# Patient Record
Sex: FEMALE
[De-identification: names, ages, dates, MRNs, and addresses within clinical notes are randomized per-mention and may not be internally consistent; named-entity substitution may affect disease eponyms.]

---

## 2018-01-09 ENCOUNTER — HOSPITAL ENCOUNTER (EMERGENCY)
Dept: HOSPITAL 25 - UCCORT | Age: 58
Discharge: HOME | End: 2018-01-09
Payer: COMMERCIAL

## 2018-01-09 VITALS — DIASTOLIC BLOOD PRESSURE: 64 MMHG | SYSTOLIC BLOOD PRESSURE: 130 MMHG

## 2018-01-09 DIAGNOSIS — K14.8: Primary | ICD-10-CM

## 2018-01-09 DIAGNOSIS — K14.6: ICD-10-CM

## 2018-01-09 PROCEDURE — 87205 SMEAR GRAM STAIN: CPT

## 2018-01-09 PROCEDURE — 99212 OFFICE O/P EST SF 10 MIN: CPT

## 2018-01-09 PROCEDURE — 96372 THER/PROPH/DIAG INJ SC/IM: CPT

## 2018-01-09 PROCEDURE — G0463 HOSPITAL OUTPT CLINIC VISIT: HCPCS

## 2018-01-09 PROCEDURE — 87640 STAPH A DNA AMP PROBE: CPT

## 2018-01-09 PROCEDURE — 87641 MR-STAPH DNA AMP PROBE: CPT

## 2018-01-09 PROCEDURE — 87070 CULTURE OTHR SPECIMN AEROBIC: CPT

## 2018-01-09 NOTE — UC
UC Dental HPI





- HPI Summary


HPI Summary: 





57 year old female presents with complains of sore/swollen tongue. 





- History of Current Complaint


Chief Complaint: UCDentalProblem


Stated Complaint: ORAL COMPLAINT


Time Seen by Provider: 01/09/18 14:32


Hx Obtained From: Patient


Hx Last Menstrual Period: n/a


Onset/Duration: Sudden Onset


Severity: Moderate


Pain Scale Used: 0-10 Numeric - 5





- Allergies/Home Medications


Allergies/Adverse Reactions: 


 Allergies











Allergy/AdvReac Type Severity Reaction Status Date / Time


 


DAIRY Allergy  Vomiting Uncoded 01/09/18 14:29











Home Medications: 


 Home Medications





Insulin Aspart [Novolog Flexpen] 1 unit SC TID 01/09/18 [History Confirmed 01/09 /18]











PMH/Surg Hx/FS Hx/Imm Hx


Previously Healthy: Yes





- Surgical History


Surgical History: Yes


Surgery Procedure, Year, and Place: Rt SHOULDER-DECOMPRESSION/DEBREEDING 2011.  

VECTRECTOMY (cleared for mri 12/1/2011); TUBAL LIGATION.  GASTRO BYPASS.  Lt 

TRIGGER FINGER.  HYSTERECTOMY





- Family History


Known Family History: Positive: None





- Social History


Alcohol Use: Rare


Substance Use Type: None


Smoking Status (MU): Never Smoked Tobacco


Have You Smoked in the Last Year: No





- Immunization History


Most Recent Influenza Vaccination: has not had





Review of Systems


Constitutional: Negative


Skin: Negative


Eyes: Negative


ENT: Other - swollen tongue


Respiratory: Negative


Cardiovascular: Negative


Gastrointestinal: Negative


Genitourinary: Negative


Motor: Negative


Neurovascular: Negative


Musculoskeletal: Negative


Neurological: Negative


Psychological: Negative


All Other Systems Reviewed And Are Negative: Yes





Physical Exam


Triage Information Reviewed: Yes


Vital Signs: 


 Initial Vital Signs











Temp  36.6 C   01/09/18 14:26


 


Pulse  76   01/09/18 14:26


 


Resp  16   01/09/18 14:26


 


BP  130/64   01/09/18 14:26


 


Pulse Ox  98   01/09/18 14:26











Eye Exam: Normal


ENT Exam: Normal


ENT: Positive: Other - swollen/erythematous tongue


Dental Exam: Normal


Neck exam: Normal


Neck: Positive: 1


Respiratory Exam: Normal


Cardiovascular Exam: Normal


Abdominal Exam: Normal


Musculoskeletal Exam: Normal


Neurological Exam: Normal


Psychological Exam: Normal


Skin Exam: Normal





Dental Complaint Course/Dx





- Differential Dx/Diagnosis


Provider Diagnoses: swollen tongue.  painful tongue





Discharge





- Discharge Plan


Condition: Stable


Disposition: HOME


Prescriptions: 


Amoxicillin/Clavulanate 600 [Augmentin Es-600 (NF)] 600 mg PO BID #100 btl


Fluconazole [Diflucan 150 MG (NF)] 150 mg PO ONCE #1 tab


Magic M W2 Andrey/Maal/Nyst/Lido* 5 ml SWISH SPIT QID PRN #120 ml


 PRN Reason: Pain


predniSONE TAB* [Deltasone TAB*] 40 mg PO DAILY #8 tab


Patient Education Materials:  Oral Candidiasis (ED)


Forms:  *Work Release


Referrals: 


Andrew Moncada MD [Primary Care Provider] -

## 2019-04-17 NOTE — HP
Amended report to enter cosigning physician.



PREOPERATIVE HISTORY AND PHYSICAL:

 

DATE OF ADMISSION/SURGERY:  04/23/19

 

DATE OF OFFICE VISIT/ENCOUNTER:  04/11/19

 

ATTENDING SURGEON:  Mayelin Sheppard MD* (dictated by CORINNE Andino).

 

PROCEDURE:  Right index and ring trigger finger releases.

 

HISTORY OF PRESENT ILLNESS:  This is a 58-year-old female who has had problems 
with triggering in her right index and ring fingers since 2017.  This is a work-
related problem.  She has received cortisone injections to both fingers, more 
than once over the past couple of years and notes it had been helpful.  
Unfortunately, the symptoms returned.  She is now interested in pursing more 
definitive treatment for this problem in the form of trigger finger releases.

 

PAST MEDICAL HISTORY:

1.  ADD.

2.  Diabetes type 1.

3.  Hypercholesteremia.

 

PAST SURGICAL HISTORY:

1.  TFCC repair, left wrist.

2.  Bilateral trigger thumb releases.

3.  Bilateral cataract removal.

4.  Right middle and left index trigger finger releases.

5.  Right shoulder surgery.

6.  Laser surgery for detached retina.

7.  Hysterectomy.

8.  Vitrectomy.

 

CURRENT MEDICATIONS:

1.  Adderall 10 mg b.i.d.

2.  Basaglar KwikPen 100 units/mL 20 units daily.

3.  NovoLog FlexPen 100 units/mL inject 1 unit at mealtime.

4.  Singulair 10 mg daily.

5.  Timolol malleate ophthalmic gel forming 0.5 one drop each eye at bedtime.

6.  Simvastatin 20 mg daily.

 

ALLERGIES:  No known drug allergies, she does have seasonal allergies.

 

FAMILY MEDICAL HISTORY:  Congestive heart failure, uterine cancer, Garcia 
syndrome, and ovarian cancer.

 

SOCIAL HISTORY:  She is employed at [employer].  She denies tobacco 
and recreational drug use.  She drinks alcohol on occasion.

 

REVIEW OF SYSTEMS:  Negative for general, cephalic, cardiovascular, respiratory
, GI, , other musculoskeletal, integumentary, endocrine, neurologic, and 
hematologic symptoms.  Infectious Disease:  Negative for MRSA, hepatitis C, HIV.

 

                               PHYSICAL EXAMINATION

 

GENERAL:  Well-developed, well-nourished, 58-year-old female, in no acute 
distress.

 

VITAL SIGNS:  Height 5 feet 2 inches, weight 185 pounds, pulse rate 82, blood 
pressure 120/80.

 

HEENT:  Normocephalic, atraumatic.  She has slight deviation of her right eye 
but pupils were equal and reactive to light and accommodation.  Extraocular 
movements are intact.  Throat is clear.

 

NECK:  Supple.  No palpable lymph nodes.

 

PULMONARY:  Lungs are clear to auscultation bilaterally.  No wheezes, rales, or 
rhonchi.

 

CARDIOVASCULAR:  Regular rate and rhythm.  S1, S2.  No murmurs, rubs, or 
gallops. No edema.

 

ABDOMEN:  Positive bowel sounds, soft, nontender.

 

NEUROLOGICAL:  Alert and oriented x3.  Cranial nerves II through XII are 
intact. Sensation is intact to light touch.

 

MUSCULOSKELETAL:  On exam of her right hand, there is no visible swelling.  
Skin is intact.  She has tenderness to palpation at the A1 pulleys at both the 
right index and ring fingers.  She has difficulty fully flexing the fingers and 
active triggering of the index finger.

 

 IMPRESSION:  Right index and ring trigger fingers.

 

PLAN:  The patient is scheduled to undergo right index and ring trigger finger 
releases with Dr. Sheppard on 04/23/19.  She will return to the office 10 days 
postop for followup and suture removal.  A prescription for Ultracet was e-
scribed to the patient's pharmacy for postoperative pain management.

 

 ____________________________________ 

CORINNE ANDINO

 

[id number]/[id number] #: [id number]

BAHMAN

## 2019-04-23 ENCOUNTER — HOSPITAL ENCOUNTER (OUTPATIENT)
Dept: HOSPITAL 25 - OREAST | Age: 59
Discharge: HOME | End: 2019-04-23
Attending: ORTHOPAEDIC SURGERY
Payer: COMMERCIAL

## 2019-04-23 VITALS — SYSTOLIC BLOOD PRESSURE: 121 MMHG | DIASTOLIC BLOOD PRESSURE: 69 MMHG

## 2019-04-23 DIAGNOSIS — F98.8: ICD-10-CM

## 2019-04-23 DIAGNOSIS — M65.321: Primary | ICD-10-CM

## 2019-04-23 DIAGNOSIS — M65.341: ICD-10-CM

## 2019-04-23 DIAGNOSIS — E78.00: ICD-10-CM

## 2019-04-23 DIAGNOSIS — E10.9: ICD-10-CM

## 2019-04-23 NOTE — OP
DATE OF OPERATION:  04/23/19 [facility]

 

DATE OF BIRTH:  [birth year]

 

SURGEON:  Mayelin Sheppard MD.

 

ASSISTANT:  CORINNE Andino.

 

ANESTHESIA:  Local MAC.

 

PRE-OP DIAGNOSIS:  Trigger finger of the right index and ring fingers.

 

POST-OP DIAGNOSIS:  Trigger finger of the right index and ring fingers.

 

OPERATIVE PROCEDURE:  Trigger release, right index and ring fingers.

 

ESTIMATED BLOOD LOSS:  Zero.

 

TOURNIQUET TIME:  Approximately 10 minutes.

 

INDICATIONS FOR PROCEDURE:  Nery is a 58-year-old female who has triggering 
and locking of her right index and ring fingers.  She presents for trigger 
release.

 

DESCRIPTION OF PROCEDURE:  The patient was brought to the operating room, was 
given a sedation anesthetic and a local infiltration of 10 cc of 1% plain 
lidocaine in the palm of her right hand.  The skin of her right hand and 
forearm was prepped and draped in the usual sterile fashion.  The hand and 
forearm were exsanguinated and the tourniquet elevated to 250 mmHg.  A 
transverse incision was made centered over the A1 pulley of the index finger 
and the ring finger.  We dissected through the subcutaneous tissue down to both 
pulleys.  The pulleys were incised longitudinally with a knife and the flexor 
tendons completely released.  The flexor tendons were in good condition.  The 
wounds were irrigated and the skin edges reapproximated with 4-0 nylon suture.  
The wounds were dressed with Xeroform, 4x4, Webril, and an Ace wrap.  The 
patient tolerated the procedure well and was brought to the recovery room in 
good condition.

 

 [id number]/CPS #: [id number]

MTDDANNIELLE

## 2019-07-26 ENCOUNTER — HOSPITAL ENCOUNTER (EMERGENCY)
Dept: HOSPITAL 25 - UCCORT | Age: 59
Discharge: HOME | End: 2019-07-26
Payer: COMMERCIAL

## 2019-07-26 VITALS — DIASTOLIC BLOOD PRESSURE: 74 MMHG | SYSTOLIC BLOOD PRESSURE: 137 MMHG

## 2019-07-26 DIAGNOSIS — E10.8: Primary | ICD-10-CM

## 2019-07-26 DIAGNOSIS — Z79.4: ICD-10-CM

## 2019-07-26 DIAGNOSIS — E06.3: ICD-10-CM

## 2019-07-26 DIAGNOSIS — M77.12: ICD-10-CM

## 2019-07-26 PROCEDURE — G0463 HOSPITAL OUTPT CLINIC VISIT: HCPCS

## 2019-07-26 PROCEDURE — 99211 OFF/OP EST MAY X REQ PHY/QHP: CPT

## 2019-07-26 NOTE — ED
Upper Extremity Pain





- HPI Summary


HPI Summary: 





58 yr old female with the complain of lateral left elbow pain.  Onset June 6th.

  The patient states that she had right hand surgery earlier in the spring.  

She has been over using the left arm, and carried a lot of bags shopping with 

just the left hand.  The following morning, June 6th, she experienced pain over 

the left lateral epicondyle.  The pain is worse with  movements and 

trying to  things like motion of picking up a cup of coffee.  She had no 

fall or direct trauma.  





- History of Current Complaint


Chief Complaint: UCUpperExtremity


Stated Complaint: LT ELBOW/ARM PAIN


Time Seen by Provider: 07/26/19 16:31


Hx Last Menstrual Period: n/a





- Allergies/Home Medications


Allergies/Adverse Reactions: 


 Allergies











Allergy/AdvReac Type Severity Reaction Status Date / Time


 


DAIRY Allergy  Vomiting Uncoded 04/23/19 06:47


 


seasonal Allergy  Unknown Uncoded 04/23/19 06:47





   Reaction  





   Details  











Home Medications: 


 Home Medications





Insulin Glargine,Hum.rec.anlog [Basaglar Kwikpen] 18 unit SC QAM 07/26/19 [

History Confirmed 07/26/19]











PMH/Surg Hx/FS Hx/Imm Hx


Endocrine/Hematology History: Reports: Hx Diabetes - TYPE I- ON INSULIN FOR, Hx 

Thyroid Disease - HASHIMOTO'S DISEASE


Cardiovascular History: 


   Denies: Hx Hypertension, Hx Pacemaker/ICD, Other Cardiovascular Problems/

Disorders


Respiratory History: 


   Denies: Hx Asthma, Hx Chronic Obstructive Pulmonary Disease (COPD)


GI History: Reports: Other GI Disorders - HOSHIMATOS DISEASE


   Denies: Hx Ulcer


 History: 


   Denies: Other  Problems/Disorders


Musculoskeletal History: Reports: Hx Arthritis


Sensory History: Reports: Hx Cataracts - BILATERAL EYES, Hx Contacts or Glasses 

- GLASSES, Hx Glaucoma - BOTH EYES


   Denies: Hx Hearing Aid


Opthamlomology History: Reports: Hx Cataracts - BILATERAL EYES, Hx Contacts or 

Glasses - GLASSES, Hx Glaucoma - BOTH EYES


Neurological History: Reports: Hx Nerve Disease - NEUROPATHY IN FEET, Other 

Neuro Impairments/Disorders - NEUROPATHY IN FEET


Psychiatric History: 


   Denies: Hx Panic Disorder





- Cancer History


Hx Chemotherapy: No


Hx Radiation Therapy: No





- Surgical History


Surgery Procedure, Year, and Place: Rt SHOULDER-DECOMPRESSION/DEBREEDING 2011.  

VITRECTOMY (cleared for mri 12/1/2011); TUBAL LIGATION.  GASTRO BYPASS.  Lt 

TRIGGER FINGER.  HYSTERECTOMY


Hx Anesthesia Reactions: Yes - NAUSEA/VOMITING


Infectious Disease History: No


Infectious Disease History: 


   Denies: Hx Clostridium Difficile, Hx Hepatitis, Hx Human Immunodeficiency 

Virus (HIV), Hx of Known/Suspected MRSA, Hx Shingles, Hx Tuberculosis, Hx Known/

Suspected VRE, Hx Known/Suspected VRSA, History Other Infectious Disease, 

Traveled Outside the [place] in Last 30 Days





- Family History


Known Family History: Positive: None





- Social History


Alcohol Use: Rare


Alcohol Amount: SOCIALLY


Substance Use Type: Reports: None


Smoking Status (MU): Never Smoked Tobacco


Have You Smoked in the Last Year: No





Review of Systems


Constitutional: Negative


Eyes: Negative


Positive: Other - left lateral elbow pain


All Other Systems Reviewed And Are Negative: Yes





Physical Exam


Triage Information Reviewed: Yes


Vital Signs On Initial Exam: 


 Initial Vitals











Temp Pulse Resp BP Pulse Ox


 


 98.2 F   78   16   137/74   98 


 


 07/26/19 16:10  07/26/19 16:10  07/26/19 16:10  07/26/19 16:10  07/26/19 16:10











Vital Signs Reviewed: Yes


Appearance: Positive: Well-Appearing, No Pain Distress


Skin: Positive: Warm, Skin Color Reflects Adequate Perfusion


Head/Face: Positive: Normal Head/Face Inspection


Eyes: Positive: EOMI


ENT: Positive: Normal ENT inspection


Neck: Positive: Nontender


Respiratory/Lung Sounds: Positive: Clear to Auscultation, Breath Sounds Present


Cardiovascular: Positive: Pulses are Symmetrical in both Upper and Lower 

Extremities


Abdomen Description: Negative: Distended


Musculoskeletal: Positive: Other - left elbow without effusion.  There is 

tenderness only over the lateral epicondyle left elbow with worsening of 

discomfort with dorsiflexion of the left wrist and engaging the posterior 

compartment muscles of the left forearm.  Making a band with my hand squeezing 

the muscles a little distal to the lat epicondyle, she has no pain on 

dorsiflexion of the left wrist.


Neurological: Positive: Sensory/Motor Intact, Alert, Oriented to Person Place, 

Time, CN Intact II-III


Psychiatric: Positive: Normal





Diagnostics





- Vital Signs


 Vital Signs











  Temp Pulse Resp BP Pulse Ox


 


 07/26/19 16:10  98.2 F  78  16  137/74  98














- Laboratory


Lab Statement: Any lab studies that have been ordered have been reviewed, and 

results considered in the medical decision making process.





- Radiology


  ** left elbow. 


Radiology Interpretation Completed By: Radiologist - NEG





Course/Dx





- Course


Course Of Treatment: 58 yr old with tennis elbow. DC home FU with ortho.





- Diagnoses


Provider Diagnoses: 


 Lateral epicondylitis, left elbow








Discharge





- Sign-Out/Discharge


Documenting (check all that apply): Patient Departure


All imaging exams completed and their final reports reviewed: Yes





- Discharge Plan


Condition: Good


Disposition: HOME


Patient Education Materials:  Tennis Elbow (ED)


Referrals: 


Andrew Moncada MD [Primary Care Provider] - 2 Days


Mayelin Sheppard MD [Medical Doctor] - 2 Days


Additional Instructions: 


 a tennis elbow strap to give support to your tendons at the drug store.

  Ask pharmacist to help you find. 





- Billing Disposition and Condition


Condition: GOOD


Disposition: Home